# Patient Record
Sex: MALE | Race: BLACK OR AFRICAN AMERICAN | NOT HISPANIC OR LATINO | Employment: FULL TIME | URBAN - NONMETROPOLITAN AREA
[De-identification: names, ages, dates, MRNs, and addresses within clinical notes are randomized per-mention and may not be internally consistent; named-entity substitution may affect disease eponyms.]

---

## 2018-09-15 ENCOUNTER — HOSPITAL ENCOUNTER (EMERGENCY)
Facility: HOSPITAL | Age: 28
Discharge: HOME | End: 2018-09-15
Admitting: EMERGENCY MEDICINE

## 2018-09-15 VITALS
HEART RATE: 70 BPM | RESPIRATION RATE: 16 BRPM | TEMPERATURE: 98.3 F | DIASTOLIC BLOOD PRESSURE: 80 MMHG | SYSTOLIC BLOOD PRESSURE: 125 MMHG | OXYGEN SATURATION: 99 % | BODY MASS INDEX: 20.79 KG/M2 | HEIGHT: 74 IN | WEIGHT: 162 LBS

## 2018-09-15 DIAGNOSIS — S83.411A SPRAIN OF MEDIAL COLLATERAL LIGAMENT OF RIGHT KNEE, INITIAL ENCOUNTER: Primary | ICD-10-CM

## 2018-09-15 PROCEDURE — 99283 EMERGENCY DEPT VISIT LOW MDM: CPT

## 2018-09-15 PROCEDURE — 25010000002 TRIAMCINOLONE PER 10 MG: Performed by: NURSE PRACTITIONER

## 2018-09-15 PROCEDURE — 96372 THER/PROPH/DIAG INJ SC/IM: CPT

## 2018-09-15 RX ORDER — IBUPROFEN 800 MG/1
800 TABLET ORAL ONCE
Status: DISCONTINUED | OUTPATIENT
Start: 2018-09-15 | End: 2018-09-15

## 2018-09-15 RX ORDER — TRIAMCINOLONE ACETONIDE 40 MG/ML
60 INJECTION, SUSPENSION INTRA-ARTICULAR; INTRAMUSCULAR ONCE
Status: COMPLETED | OUTPATIENT
Start: 2018-09-15 | End: 2018-09-15

## 2018-09-15 RX ADMIN — TRIAMCINOLONE ACETONIDE 60 MG: 40 INJECTION, SUSPENSION INTRA-ARTICULAR; INTRAMUSCULAR at 19:11

## 2018-09-15 NOTE — ED PROVIDER NOTES
Subjective   Mr. Yu is a 28-year-old male patient presents today with an acute onset of right knee pain for approximately 2 days.  Patient states that he walked into a concrete post 2 days ago and injured the knee.  He describes the pain as a stabbing sensation to the inside of the knee.  It is most painful with weight bearing, some pain at rest, does wake in the night. No history of any previous injuries. Tried compression wrap, no OTC medications.  Denies any other complaints or injuries at this time.        History provided by:  Patient   used: No        Review of Systems   Constitutional: Negative for chills, fatigue and fever.   HENT: Negative for congestion, rhinorrhea, sore throat and voice change.    Eyes: Negative for discharge and visual disturbance.   Respiratory: Negative for cough, shortness of breath and wheezing.    Cardiovascular: Negative for chest pain.   Gastrointestinal: Negative for abdominal pain, diarrhea, nausea and vomiting.   Endocrine: Negative.    Genitourinary: Negative.  Negative for decreased urine volume and difficulty urinating.   Musculoskeletal: Positive for arthralgias (right knee).   Skin: Negative.  Negative for wound.   Allergic/Immunologic: Negative.    Neurological: Negative for weakness and headaches.   Hematological: Negative.    Psychiatric/Behavioral: Negative.        No past medical history on file.    No Known Allergies    No past surgical history on file.    No family history on file.    Social History     Social History   • Marital status: Single     Social History Main Topics   • Drug use: Unknown     Other Topics Concern   • Not on file           Objective   Physical Exam   Constitutional: He is oriented to person, place, and time. He appears well-developed and well-nourished.   HENT:   Head: Normocephalic and atraumatic.   Right Ear: External ear normal.   Left Ear: External ear normal.   Nose: Nose normal.   Mouth/Throat: Oropharynx is clear  and moist.   Eyes: Pupils are equal, round, and reactive to light. EOM are normal.   Neck: Normal range of motion. Neck supple.   Cardiovascular: Normal rate and regular rhythm.    Pulmonary/Chest: Effort normal and breath sounds normal.   Abdominal: Soft. Bowel sounds are normal.   Musculoskeletal:        Right knee: He exhibits decreased range of motion. He exhibits no swelling, no effusion, no deformity, normal alignment, no LCL laxity, normal patellar mobility, no bony tenderness, normal meniscus and no MCL laxity. Tenderness found. MCL tenderness noted. No medial joint line, no lateral joint line, no LCL and no patellar tendon tenderness noted.   Neurological: He is alert and oriented to person, place, and time.   Skin: Skin is warm and dry.   Nursing note and vitals reviewed.      Procedures  Medications   triamcinolone acetonide (KENALOG-40) injection 60 mg (60 mg Intramuscular Given 9/15/18 1911)              ED Course  ED Course as of Sep 16 1611   Sat Sep 15, 2018   1830 Patient declines offer for x-ray at this time.  After clinical exam it is reasonable to forego this imaging at this time.  Patient states he is unable to afford any prescription medications, we will give him Kenalog and I will write for a inexpensive anti-inflammatory for him to take.  A knee brace is dispensed and he is instructed on use.  Patient instructed to follow-up with primary care provider for further evaluation should the knee continue to bother.  [BA]      ED Course User Index  [BA] Sandra Welch APRN                  [facility]      Final diagnoses:   Sprain of medial collateral ligament of right knee, initial encounter            Sandra Welch APRN  09/16/18 1611

## 2018-09-16 NOTE — DISCHARGE INSTRUCTIONS
I recommend avoiding exacerbating maneuvers for the next week.  Take over-the-counter ibuprofen as tolerated.  Follow-up with orthopedic surgery for repeat evaluation.  Wear the brace if needed for knee support especially when walking long distances.  For any new or worsening symptoms he may return to emergency room.